# Patient Record
Sex: MALE | Race: WHITE | Employment: FULL TIME | URBAN - METROPOLITAN AREA
[De-identification: names, ages, dates, MRNs, and addresses within clinical notes are randomized per-mention and may not be internally consistent; named-entity substitution may affect disease eponyms.]

---

## 2017-12-15 ENCOUNTER — HOSPITAL ENCOUNTER (OUTPATIENT)
Dept: PHYSICAL THERAPY | Age: 62
Discharge: HOME | End: 2017-12-15
Payer: COMMERCIAL

## 2017-12-15 PROCEDURE — 97110 THERAPEUTIC EXERCISES: CPT

## 2017-12-15 PROCEDURE — 97162 PT EVAL MOD COMPLEX 30 MIN: CPT

## 2017-12-15 NOTE — PROGRESS NOTES
[facility]  [address]  [place], [address]  [phone] fax    Plan of Care/ Statement of Necessity for Physical Therapy Services    Patient name: Paty Farley Start of Care: 12/15/2017   Referral source: Wnig Duff MD : 1955    Medical Diagnosis: Left hip pain [M25.552]   Onset Date:1 year ago    Treatment Diagnosis: Piriformis pain   Prior Hospitalization: see medical history Provider#: [id number]   Medications: Verified on Patient summary List    Comorbidities: Diabetes, HTN, L Salivary gland excision    Prior Level of Function: The patient states that he was able to sleep through the night with improved ease prior to 1 year ago. The Plan of Care and following information is based on the information from the initial evaluation. Assessment/ key information: The patient is a 65year old male with a chief complaint of L hip pain that began about 1 year ago when he had fallen backwards onto some wood. He stated that he sat on a doughnut pillow for awhile, and the pain gradually moved to his left hip. He states that,chiefly, the pain keeps him from sleeping well. He indicates that the pain is bothersome in sitting throughout the day as well. He denies recent radiographs. The patient is negative for lumbar screen and neuro screen, but does have pain upon SIJ PAs through his left, but is positive for point tenderness for chief complaint reproduction upon piriformis palpation and display significant limitations in flexibility through this muscle group. The patient has signs and symptoms consistent with piriformis pain and possible SIJ dysfunction to a lesser degree. The patient has impairments consisting of pain, decreased ROM, decreased flexibility, decreased strength, limited ability to sleep through the night, and discomfort with sitting.  The patient will benefit from skilled PT in order to address the aforementioned impairments. Evaluation Complexity History MEDIUM  Complexity : 1-2 comorbidities / personal factors will impact the outcome/ POC ; Examination MEDIUM Complexity : 3 Standardized tests and measures addressing body structure, function, activity limitation and / or participation in recreation  ;Presentation MEDIUM Complexity : Evolving with changing characteristics  ; Clinical Decision Making MEDIUM Complexity : FOTO score of 26-74  Overall Complexity Rating: MEDIUM  Problem List: pain affecting function, decrease ROM, decrease strength, decrease ADL/ functional abilitiies, decrease activity tolerance and decrease flexibility/ joint mobility   Treatment Plan may include any combination of the following: Therapeutic exercise, Therapeutic activities, Neuromuscular re-education, Physical agent/modality, Manual therapy and Patient education  Patient / Family readiness to learn indicated by: asking questions, trying to perform skills and interest  Persons(s) to be included in education: patient (P)  Barriers to Learning/Limitations: None  Patient Goal (s): pain free  Patient Self Reported Health Status: good  Rehabilitation Potential: good    Short Term Goals: To be accomplished in 2 weeks:   1. The patient will be independent and compliant with HEP to maximize therapeutic benefit. 2. The patient will improve HS flexibility to 30 degrees 90/90 to reduce stress on lumbar spine. Long Term Goals: To be accomplished in 4 weeks:   1. The patient will improve FOTO score to 71 to improve quality of life. 2. The patient will improve hip ABD/EXT to 5/5 MMT to maximize stability during ADLs. 3. The patient will display equal piriformis flexibility to reduce stress through hip during ADLs. 4. The patient will report sleeping through the night without being awoken by L hip pain to in order to maximize occupational efficiency. Frequency / Duration: Patient to be seen 2 times per week for 4 weeks.     Patient/ Caregiver education and instruction: Diagnosis, prognosis, self care, activity modification and exercises   [x]  Plan of care has been reviewed with ELIGIO Willett, PT 12/15/2017 3:41 PM    ________________________________________________________________________    I certify that the above Therapy Services are being furnished while the patient is under my care. I agree with the treatment plan and certify that this therapy is necessary.     [de-identified] Signature:____________________  Date:____________Time: _________    Please sign and return to [facility]  [address] [facility] [address]  [place], [address]  [phone] fax

## 2017-12-15 NOTE — PROGRESS NOTES
PT DAILY TREATMENT NOTE     Patient Name: Ifeanyi Colin  Date:12/15/2017  : 1955  [x]  Patient  Verified  Payor: Delena Boeck / Plan: Carole Avila / Product Type: HMO /    In time:3:05  Out time:3:45  Total Treatment Time (min): 40  Visit #: 1 of 8    Treatment Area: Left hip pain [M25.552]    SUBJECTIVE  Pain Level (0-10 scale): 2/10  Any medication changes, allergies to medications, adverse drug reactions, diagnosis change, or new procedure performed?: [x] No    [] Yes (see summary sheet for update)  Subjective functional status/changes:   [] No changes reported  The patient states that he has a chief complaint of limited ability to sleep through the night due to L hip pain. OBJECTIVE  12 min Therapeutic Exercise:  [x] See flow sheet :   Rationale: increase ROM and increase strength to improve the patients ability to improve ADL ease. With   [] TE   [] TA   [] neuro   [] other: Patient Education: [x] Review HEP    [] Progressed/Changed HEP based on:   [] positioning   [] body mechanics   [] transfers   [] heat/ice application    [] other:      Other Objective/Functional Measures: See IE. Pain Level (0-10 scale) post treatment: 0/10    ASSESSMENT/Changes in Function: See POC. Patient will continue to benefit from skilled PT services to modify and progress therapeutic interventions, address functional mobility deficits, address ROM deficits, address strength deficits, analyze and address soft tissue restrictions, analyze and cue movement patterns, analyze and modify body mechanics/ergonomics, assess and modify postural abnormalities and instruct in home and community integration to attain remaining goals. []  See Plan of Care  []  See progress note/recertification  []  See Discharge Summary         Progress towards goals / Updated goals:  Short Term Goals:  To be accomplished in 2 weeks:                         1. The patient will be independent and compliant with HEP to maximize therapeutic benefit. 2. The patient will improve HS flexibility to 30 degrees 90/90 to reduce stress on lumbar spine. Long Term Goals: To be accomplished in 4 weeks:                         1. The patient will improve FOTO score to 71 to improve quality of life. 2. The patient will improve hip ABD/EXT to 5/5 MMT to maximize stability during ADLs. 3. The patient will display equal piriformis flexibility to reduce stress through hip during ADLs. 4. The patient will report sleeping through the night without being awoken by L hip pain to in order to maximize occupational efficiency.     PLAN  []  Upgrade activities as tolerated     [x]  Continue plan of care  []  Update interventions per flow sheet       []  Discharge due to:_  []  Other:_      Yonathan Lund, ROSA 12/15/2017  3:56 PM    Future Appointments  Date Time Provider Ruby Swain   12/19/2017 4:00 PM [facility]   12/21/2017 3:00 PM Alpa Booth PTA [facility]   12/26/2017 2:00 PM Alpa Booth PTA [facility]   12/28/2017 4:00 PM Alpa Booth PTA [facility]   1/2/2018 4:30 PM Yonathan Lund, ROSA [facility]   1/4/2018 4:30 PM [id number] [facility]   1/9/2018 3:00 PM Fidel Chu [facility]

## 2017-12-19 ENCOUNTER — HOSPITAL ENCOUNTER (OUTPATIENT)
Dept: PHYSICAL THERAPY | Age: 62
Discharge: HOME | End: 2017-12-19
Payer: COMMERCIAL

## 2017-12-19 PROCEDURE — 97112 NEUROMUSCULAR REEDUCATION: CPT

## 2017-12-19 PROCEDURE — 97110 THERAPEUTIC EXERCISES: CPT

## 2017-12-19 PROCEDURE — 97140 MANUAL THERAPY 1/> REGIONS: CPT

## 2017-12-19 NOTE — PROGRESS NOTES
PT DAILY TREATMENT NOTE     Patient Name: Paty Farley  Date:2017  : 1955  [x]  Patient  Verified  Payor: Ladi Luz / Plan: Satnam Espinosa / Product Type: HMO /    In time:4;00pm  Out time:4:46pm  Total Treatment Time (min): 46  Visit #: 2 of 8    Treatment Area: Left hip pain [M25.552]    SUBJECTIVE  Pain Level (0-10 scale): 2  Any medication changes, allergies to medications, adverse drug reactions, diagnosis change, or new procedure performed?: [x] No    [] Yes (see summary sheet for update)  Subjective functional status/changes:   [x] No changes reported    OBJECTIVE  8 min Manual Therapy:  Pelvic alignment assessment, TPR to L piriformis, manual fig 4 hold relax stretch x 2   Rationale: decrease pain, increase ROM and increase tissue extensibility to improve ease of ADLs. 28 min Therapeutic Exercise:  [x] See flow sheet :   Rationale: increase ROM and increase strength to improve the patients ability to improve ease of daily activity. 10 min Neuromuscular Re-education:  [x]  See flow sheet :   Rationale: increase strength, improve coordination and increase proprioception  to improve the patients ability to improve ease of daily activity. With   [] TE   [] TA   [] neuro   [] other: Patient Education: [x] Review HEP    [] Progressed/Changed HEP based on:   [] positioning   [] body mechanics   [] transfers   [] heat/ice application    [] other:      Other Objective/Functional Measures: TTP L piriformis with associated limited L hip LEROY mobility, somewhat limited on R as well     Pain Level (0-10 scale) post treatment: 1    ASSESSMENT/Changes in Function: Pt reports minimal change in hip pain post treatment. No adverse response to any interventions today.     Patient will continue to benefit from skilled PT services to modify and progress therapeutic interventions, address functional mobility deficits, address ROM deficits, address strength deficits, analyze and address soft tissue restrictions, analyze and cue movement patterns, analyze and modify body mechanics/ergonomics, assess and modify postural abnormalities and instruct in home and community integration to attain remaining goals. []  See Plan of Care  []  See progress note/recertification  []  See Discharge Summary         Progress towards goals / Updated goals:  Short Term Goals: To be accomplished in 2 weeks:                         9. The patient will be independent and compliant with HEP to maximize therapeutic benefit.                         2. The patient will improve HS flexibility to 30 degrees 90/90 to reduce stress on lumbar spine. Long Term Goals: To be accomplished in 4 weeks:                         1. The patient will improve FOTO score to 71 to improve quality of life.                         2. The patient will improve hip ABD/EXT to 5/5 MMT to maximize stability during ADLs.                        6. The patient will display equal piriformis flexibility to reduce stress through hip during ADLs.                        1. The patient will report sleeping through the night without being awoken by L hip pain to in order to maximize occupational efficiency.     PLAN  [x]  Upgrade activities as tolerated     [x]  Continue plan of care  []  Update interventions per flow sheet       []  Discharge due to:_  []  Other:_      Kyle Flores, PT, DPT, ATC, CSCS 12/19/2017  4:16 PM    Future Appointments  Date Time Provider Ruby Swain   12/21/2017 3:00 PM Eulah Bending, PTA [facility]   12/26/2017 2:00 PM Eulah Bending, PTA [facility]   12/28/2017 4:00 PM Eulah Bending, PTA [facility]   1/2/2018 4:30 PM Benny Bajwa PT [facility]   1/4/2018 4:30 PM [id number] [facility]   1/9/2018 3:00 PM Hank Payment [facility]

## 2017-12-21 ENCOUNTER — HOSPITAL ENCOUNTER (OUTPATIENT)
Dept: PHYSICAL THERAPY | Age: 62
Discharge: HOME | End: 2017-12-21
Payer: COMMERCIAL

## 2017-12-21 PROCEDURE — 97140 MANUAL THERAPY 1/> REGIONS: CPT

## 2017-12-21 PROCEDURE — 97110 THERAPEUTIC EXERCISES: CPT

## 2017-12-21 PROCEDURE — 97112 NEUROMUSCULAR REEDUCATION: CPT

## 2017-12-21 NOTE — PROGRESS NOTES
PT DAILY TREATMENT NOTE     Patient Name: Marcela Culver  Date:2017  : 1955  [x]  Patient  Verified  Payor: Estela Street / Plan: Rossy Zhu / Product Type: HMO /    In time:8:30  Out time:908  Total Treatment Time (min): 38  Visit #: 3 of 8    Treatment Area: Left hip pain [M25.552]    SUBJECTIVE  Pain Level (0-10 scale): 1/10  Any medication changes, allergies to medications, adverse drug reactions, diagnosis change, or new procedure performed?: [x] No    [] Yes (see summary sheet for update)  Subjective functional status/changes:   [] No changes reported  Pt reports no new complaints of pain. Pt reports significant decrease in pain since last visit. OBJECTIVE    20 min Therapeutic Exercise:  [x] See flow sheet :   Rationale: increase ROM and increase strength to improve the patients ability to tolerate ADLs. 10 min Neuromuscular Re-education:  [x]  See flow sheet :   Rationale: increase strength, improve coordination and increase proprioception  to improve the patients ability to tolerate ADLs. 8 min Manual Therapy:  Piriformis release   Rationale: decrease pain, increase ROM and increase tissue extensibility to improve tolerance to functional activities. With   [] TE   [] TA   [] neuro   [] other: Patient Education: [x] Review HEP    [] Progressed/Changed HEP based on:   [] positioning   [] body mechanics   [] transfers   [] heat/ice application    [] other:      Other Objective/Functional Measures: neutral pelvic alignment. Pain Level (0-10 scale) post treatment: 0/10    ASSESSMENT/Changes in Function: Pt demonstrates good form with all exercises. Pt continues to demonstrate HS tightness.      Patient will continue to benefit from skilled PT services to modify and progress therapeutic interventions, address functional mobility deficits, address ROM deficits, address strength deficits, analyze and address soft tissue restrictions, analyze and cue movement patterns and analyze and modify body mechanics/ergonomics to attain remaining goals. []  See Plan of Care  []  See progress note/recertification  []  See Discharge Summary         Progress towards goals / Updated goals:  Short Term Goals: To be accomplished in 2 weeks:                         8. The patient will be independent and compliant with HEP to maximize therapeutic benefit.                         2. The patient will improve HS flexibility to 30 degrees 90/90 to reduce stress on lumbar spine. Long Term Goals: To be accomplished in 4 weeks:                         1. The patient will improve FOTO score to 71 to improve quality of life.                         2. The patient will improve hip ABD/EXT to 5/5 MMT to maximize stability during ADLs.                        0. The patient will display equal piriformis flexibility to reduce stress through hip during ADLs.                        1. The patient will report sleeping through the night without being awoken by L hip pain to in order to maximize occupational efficiency.     PLAN  []  Upgrade activities as tolerated     [x]  Continue plan of care  []  Update interventions per flow sheet       []  Discharge due to:_  []  Other:_      Mary Coburn PTA 12/21/2017  8:48 AM    Future Appointments  Date Time Provider Ruby Swain   12/26/2017 2:00 PM Mary Coburn PTA [facility]   12/28/2017 4:00 PM Mary Coburn PTA [facility]   1/2/2018 4:30 PM Marley Bragg PT [facility]   1/4/2018 4:30 PM [id number] [facility]   1/9/2018 3:00 PM Milan Mccarty [facility]

## 2017-12-26 ENCOUNTER — APPOINTMENT (OUTPATIENT)
Dept: PHYSICAL THERAPY | Age: 62
End: 2017-12-26
Payer: COMMERCIAL

## 2017-12-28 ENCOUNTER — HOSPITAL ENCOUNTER (OUTPATIENT)
Dept: PHYSICAL THERAPY | Age: 62
Discharge: HOME | End: 2017-12-28
Payer: COMMERCIAL

## 2017-12-28 PROCEDURE — 97112 NEUROMUSCULAR REEDUCATION: CPT

## 2017-12-28 PROCEDURE — 97110 THERAPEUTIC EXERCISES: CPT

## 2017-12-28 PROCEDURE — 97140 MANUAL THERAPY 1/> REGIONS: CPT

## 2017-12-28 NOTE — PROGRESS NOTES
PT DAILY TREATMENT NOTE     Patient Name: Leilani Sosa  Date:2017  : 1955  [x]  Patient  Verified  Payor: Denia Liner / Plan: Bernabe Matthews / Product Type: HMO /    In time:4:00  Out time:4:35  Total Treatment Time (min): 35  Visit #: 4 of 8    Treatment Area: Left hip pain [M25.552]    SUBJECTIVE  Pain Level (0-10 scale): 3-4/10  Any medication changes, allergies to medications, adverse drug reactions, diagnosis change, or new procedure performed?: [x] No    [] Yes (see summary sheet for update)  Subjective functional status/changes:   [] No changes reported  Pt reports an increase in pain last night in his lumbar region with pain radiating into his L hamstring. Pt also reports he feels that his symptoms are coming from his back but he has yet to have any imaging done. Pt also states he is concerned with his high insurance deductible that will be restarting next week. Pt states he may self DC due to financial reasons. OBJECTIVE    17 min Therapeutic Exercise:  [x] See flow sheet :   Rationale: increase ROM and increase strength to improve the patients ability to improve tolerance to ADLs. 10 min Neuromuscular Re-education:  [x]  See flow sheet :   Rationale: increase strength, improve coordination and increase proprioception  to improve the patients ability to perform functional activities. 8 min Manual Therapy:  MET to correct L anterior pelvic innominate, shotgun technique performed with patient consent. Piriformis relase performed on L side. Rationale: decrease pain, increase ROM and increase tissue extensibility to improve tolerance to functional activities. With   [] TE   [] TA   [] neuro   [] other: Patient Education: [x] Review HEP    [] Progressed/Changed HEP based on:   [] positioning   [] body mechanics   [] transfers   [] heat/ice application    [] other:      Other Objective/Functional Measures:  FOTO: 79    Hip MMT bilaterally extension/abduction 4+/5.    HS flexibility 90/90 R 42 degrees, L 35 degrees. Pain Level (0-10 scale) post treatment: 1/10    ASSESSMENT/Changes in Function: Pt has pain reduction with piriformis release and performing therapeutic exercises. Pt may DC after today due to financial reasons. Patient will continue to benefit from skilled PT services to modify and progress therapeutic interventions, address functional mobility deficits, address ROM deficits, address strength deficits, analyze and address soft tissue restrictions, analyze and cue movement patterns, analyze and modify body mechanics/ergonomics and assess and modify postural abnormalities to attain remaining goals. []  See Plan of Care  []  See progress note/recertification  []  See Discharge Summary         Progress towards goals / Updated goals:  Short Term Goals: To be accomplished in 2 weeks:                         4. The patient will be independent and compliant with HEP to maximize therapeutic benefit. - Met per patient report. 12/28/17                         2. The patient will improve HS flexibility to 30 degrees 90/90 to reduce stress on lumbar spine. - not met L 35 degrees; R 45 degrees. 12/128/17  Long Term Goals: To be accomplished in 4 weeks:                         2. The patient will improve FOTO score to 71 to improve quality of life. - met score 79. 12/28/17                         2. The patient will improve hip ABD/EXT to 5/5 MMT to maximize stability during ADLs. -not met hip abduction and extension strength 4+/5. 12/28/17                         3. The patient will display equal piriformis flexibility to reduce stress through hip during ADLs. - not met. Continued restrictions through L piriformis. 12/28/17                         4. The patient will report sleeping through the night without being awoken by L hip pain to in order to maximize occupational efficiency. - not met per patient report.  12/28/17       PLAN  []  Upgrade activities as tolerated     []  Continue plan of care  []  Update interventions per flow sheet       []  Discharge due to:_  []  Other:_      Riana Person, PTA 12/28/2017  4:26 PM    Future Appointments  Date Time Provider Ruby Swain   [facility]2018 4:30 [facility]   1/4/2018 4:30 PM [id number] [facility]   1/9/2018 3:00 PM Rosalba Tomlin [facility]

## 2018-01-02 ENCOUNTER — HOSPITAL ENCOUNTER (OUTPATIENT)
Dept: PHYSICAL THERAPY | Age: 63
End: 2018-01-02

## 2018-01-04 ENCOUNTER — APPOINTMENT (OUTPATIENT)
Dept: PHYSICAL THERAPY | Age: 63
End: 2018-01-04

## 2018-01-09 ENCOUNTER — APPOINTMENT (OUTPATIENT)
Dept: PHYSICAL THERAPY | Age: 63
End: 2018-01-09

## 2018-03-13 NOTE — PROGRESS NOTES
[facility] [address]  [place], [address]  [phone] fax    Physical Therapy Discharge Summary  Patient name: Ruddy Fagan Start of Care: 12/15/2017   Referral source: Deo Leonard MD : 1955                          Medical Diagnosis: Left hip pain [M25.552] Onset Date:1 year ago                          Treatment Diagnosis: Piriformis pain   Prior Hospitalization: see medical history Provider#: [id number]   Medications: Verified on Patient summary List    Comorbidities: Diabetes, HTN, L Salivary gland excision    Prior Level of Function: The patient states that he was able to sleep through the night with improved ease prior to 1 year ago.    Visits from Start of Care: 4    Missed Visits: 0  Reporting Period : 12/15/2018 to 2018      Summary of Care:  Short Term Goals: To be accomplished in 2 weeks:                         1. The patient will be independent and compliant with HEP to maximize therapeutic benefit. - Met per patient report. 17                         2. The patient will improve HS flexibility to 30 degrees 90/90 to reduce stress on lumbar spine. - not met L 35 degrees; R 45 degrees.   Long Term Goals: To be accomplished in 4 weeks:                         0. The patient will improve FOTO score to 71 to improve quality of life. - met score 79. 17                         2. The patient will improve hip ABD/EXT to 5/5 MMT to maximize stability during ADLs. -not met hip abduction and extension strength 4+/5. 17                         3. The patient will display equal piriformis flexibility to reduce stress through hip during ADLs. - not met. Continued restrictions through L piriformis. 17                         4. The patient will report sleeping through the night without being awoken by L hip pain to in order to maximize occupational efficiency. - not met per patient report. 12/28/17    ASSESSMENT/RECOMMENDATIONS: The patient self discharged due to financial reasons. D/C for this reason.     [x]Discontinue therapy: []Patient has reached or is progressing toward set goals      [x]Patient is non-compliant or has abdicated      []Due to lack of appreciable progress towards set [address], PT 3/13/2018 8:52 AM